# Patient Record
Sex: FEMALE | Race: WHITE | URBAN - METROPOLITAN AREA
[De-identification: names, ages, dates, MRNs, and addresses within clinical notes are randomized per-mention and may not be internally consistent; named-entity substitution may affect disease eponyms.]

---

## 2017-03-16 ENCOUNTER — OFFICE VISIT (OUTPATIENT)
Dept: FAMILY MEDICINE CLINIC | Age: 42
End: 2017-03-16
Payer: COMMERCIAL

## 2017-03-16 VITALS
RESPIRATION RATE: 17 BRPM | BODY MASS INDEX: 26.04 KG/M2 | SYSTOLIC BLOOD PRESSURE: 119 MMHG | OXYGEN SATURATION: 98 % | TEMPERATURE: 98.6 F | WEIGHT: 162.04 LBS | HEART RATE: 84 BPM | HEIGHT: 66 IN | DIASTOLIC BLOOD PRESSURE: 78 MMHG

## 2017-03-16 DIAGNOSIS — J01.01 ACUTE RECURRENT MAXILLARY SINUSITIS: Primary | ICD-10-CM

## 2017-03-16 PROCEDURE — 99213 OFFICE O/P EST LOW 20 MIN: CPT | Performed by: NURSE PRACTITIONER

## 2017-03-16 RX ORDER — DOXYCYCLINE HYCLATE 100 MG
100 TABLET ORAL 2 TIMES DAILY
Qty: 20 TABLET | Refills: 0 | Status: SHIPPED | OUTPATIENT
Start: 2017-03-16 | End: 2017-03-26

## 2017-03-16 ASSESSMENT — ENCOUNTER SYMPTOMS
SHORTNESS OF BREATH: 1
VOICE CHANGE: 0
EYE REDNESS: 0
WHEEZING: 0
SORE THROAT: 1
SWOLLEN GLANDS: 1
CHEST TIGHTNESS: 0
COUGH: 1
SINUS PRESSURE: 1
EYE DISCHARGE: 0

## 2017-07-19 ENCOUNTER — HOSPITAL ENCOUNTER (OUTPATIENT)
Age: 42
Setting detail: SPECIMEN
Discharge: HOME | End: 2017-07-19
Payer: COMMERCIAL

## 2017-07-28 LAB — CYTOLOGY REPORT: NORMAL

## 2017-08-08 ENCOUNTER — HOSPITAL ENCOUNTER (OUTPATIENT)
Dept: MAMMOGRAPHY | Age: 42
Discharge: HOME | End: 2017-08-08
Payer: COMMERCIAL

## 2017-08-08 DIAGNOSIS — Z12.31 VISIT FOR SCREENING MAMMOGRAM: ICD-10-CM

## 2017-08-08 PROCEDURE — G0202 SCR MAMMO BI INCL CAD: HCPCS

## 2017-08-08 PROCEDURE — 77063 BREAST TOMOSYNTHESIS BI: CPT

## 2018-07-11 ENCOUNTER — HOSPITAL ENCOUNTER (OUTPATIENT)
Age: 43
Setting detail: SPECIMEN
Discharge: HOME | End: 2018-07-11
Payer: COMMERCIAL

## 2018-07-24 LAB — CYTOLOGY REPORT: NORMAL

## 2018-12-28 ENCOUNTER — OFFICE VISIT (OUTPATIENT)
Dept: FAMILY MEDICINE CLINIC | Age: 43
End: 2018-12-28
Payer: COMMERCIAL

## 2018-12-28 VITALS
DIASTOLIC BLOOD PRESSURE: 90 MMHG | BODY MASS INDEX: 28.56 KG/M2 | SYSTOLIC BLOOD PRESSURE: 128 MMHG | OXYGEN SATURATION: 97 % | HEIGHT: 67 IN | HEART RATE: 64 BPM | WEIGHT: 182 LBS | TEMPERATURE: 97.6 F

## 2018-12-28 DIAGNOSIS — J01.90 ACUTE BACTERIAL SINUSITIS: Primary | ICD-10-CM

## 2018-12-28 DIAGNOSIS — B96.89 ACUTE BACTERIAL SINUSITIS: Primary | ICD-10-CM

## 2018-12-28 PROCEDURE — 99214 OFFICE O/P EST MOD 30 MIN: CPT | Performed by: NURSE PRACTITIONER

## 2018-12-28 RX ORDER — LEVOTHYROXINE SODIUM 0.07 MG/1
TABLET ORAL
COMMUNITY

## 2018-12-28 RX ORDER — ALPRAZOLAM 1 MG/1
TABLET ORAL
COMMUNITY
Start: 2018-11-26

## 2018-12-28 RX ORDER — CEFUROXIME AXETIL 250 MG/1
250 TABLET ORAL 2 TIMES DAILY
Qty: 20 TABLET | Refills: 0 | Status: SHIPPED | OUTPATIENT
Start: 2018-12-28 | End: 2019-01-07

## 2018-12-28 RX ORDER — TRAZODONE HYDROCHLORIDE 150 MG/1
150 TABLET ORAL
COMMUNITY

## 2018-12-28 RX ORDER — VITAMIN A, VITAMIN C, VITAMIN D-3, VITAMIN E, VITAMIN B-1, VITAMIN B-2, NIACIN, VITAMIN B-6, CALCIUM, IRON, ZINC, COPPER 4000; 120; 400; 22; 1.84; 3; 20; 10; 1; 12; 200; 27; 25; 2 [IU]/1; MG/1; [IU]/1; MG/1; MG/1; MG/1; MG/1; MG/1; MG/1; UG/1; MG/1; MG/1; MG/1; MG/1
TABLET ORAL
COMMUNITY
Start: 2018-12-17

## 2018-12-28 RX ORDER — VERAPAMIL HYDROCHLORIDE 80 MG/1
TABLET ORAL
COMMUNITY
End: 2019-07-26

## 2018-12-28 RX ORDER — METOPROLOL SUCCINATE 50 MG
TABLET, EXTENDED RELEASE 24 HR ORAL
COMMUNITY
Start: 2018-10-12

## 2018-12-28 RX ORDER — HYDROCODONE BITARTRATE AND ACETAMINOPHEN 5; 325 MG/1; MG/1
1 TABLET ORAL
COMMUNITY
Start: 2016-07-25

## 2018-12-28 ASSESSMENT — ENCOUNTER SYMPTOMS
WHEEZING: 0
CHEST TIGHTNESS: 0
EYE REDNESS: 0
SHORTNESS OF BREATH: 0
SINUS PRESSURE: 1
COUGH: 1
SORE THROAT: 1
EYE DISCHARGE: 0
VOICE CHANGE: 0

## 2018-12-28 NOTE — PATIENT INSTRUCTIONS
hot, wet towel or a warm gel pack on your face 3 or 4 times a day for 5 to 10 minutes each time. · Try a decongestant nasal spray like oxymetazoline (Afrin). Do not use it for more than 3 days in a row. Using it for more than 3 days can make your congestion worse. When should you call for help? Call your doctor now or seek immediate medical care if:    · You have new or worse swelling or redness in your face or around your eyes.     · You have a new or higher fever.    Watch closely for changes in your health, and be sure to contact your doctor if:    · You have new or worse facial pain.     · The mucus from your nose becomes thicker (like pus) or has new blood in it.     · You are not getting better as expected. Where can you learn more? Go to https://Cswitchpezenaidaeb.PeeplePass. org and sign in to your [employer]agin[employer] account. Enter Z603 in the Reviews42 box to learn more about \"Sinusitis: Care Instructions. \"     If you do not have an account, please click on the \"Sign Up Now\" link. Current as of: March 28, 2018  Content Version: 11.8  © 0141-4114 [employer]. Care instructions adapted under license by [facility] ([facility]). If you have questions about a medical condition or this instruction, always ask your healthcare professional. [place] [address] any warranty or liability for your use of this information. Patient Education        Saline Nasal Washes: Care Instructions  Your Care Instructions  Saline nasal washes help keep the nasal passages open by washing out thick or dried mucus. This simple remedy can help relieve symptoms of allergies, sinusitis, and colds. It also can make the nose feel more comfortable by keeping the mucous membranes moist. You may notice a little burning sensation in your nose the first few times you use the solution, but this usually gets better in a few days. Follow-up care is a key part of your treatment and safety.  Be sure to make and go

## 2019-07-26 ENCOUNTER — OFFICE VISIT (OUTPATIENT)
Dept: FAMILY MEDICINE CLINIC | Age: 44
End: 2019-07-26
Payer: COMMERCIAL

## 2019-07-26 VITALS
WEIGHT: 186 LBS | SYSTOLIC BLOOD PRESSURE: 102 MMHG | TEMPERATURE: 98.1 F | RESPIRATION RATE: 18 BRPM | BODY MASS INDEX: 31.76 KG/M2 | OXYGEN SATURATION: 98 % | HEIGHT: 64 IN | DIASTOLIC BLOOD PRESSURE: 76 MMHG | HEART RATE: 85 BPM

## 2019-07-26 DIAGNOSIS — M25.571 ACUTE RIGHT ANKLE PAIN: Primary | ICD-10-CM

## 2019-07-26 DIAGNOSIS — S96.911A RIGHT ANKLE STRAIN, INITIAL ENCOUNTER: ICD-10-CM

## 2019-07-26 PROBLEM — N39.0 RECURRENT UTI: Status: ACTIVE | Noted: 2019-04-10

## 2019-07-26 PROBLEM — N39.8 VOIDING DYSFUNCTION: Status: ACTIVE | Noted: 2019-04-10

## 2019-07-26 PROCEDURE — 99213 OFFICE O/P EST LOW 20 MIN: CPT | Performed by: NURSE PRACTITIONER

## 2019-07-26 RX ORDER — RENAGEL 800 MG/1
TABLET ORAL
COMMUNITY
Start: 2019-07-08

## 2019-07-26 RX ORDER — AMANTADINE HYDROCHLORIDE 100 MG/1
CAPSULE, GELATIN COATED ORAL
COMMUNITY
Start: 2019-07-18

## 2019-07-26 RX ORDER — GABAPENTIN 300 MG/1
300 CAPSULE ORAL
COMMUNITY
Start: 2019-04-10 | End: 2020-04-09

## 2019-07-26 RX ORDER — LORATADINE 10 MG/1
10 TABLET ORAL
COMMUNITY

## 2019-07-26 RX ORDER — TAMSULOSIN HYDROCHLORIDE 0.4 MG/1
CAPSULE ORAL
COMMUNITY
Start: 2019-06-13

## 2019-07-26 RX ORDER — VERAPAMIL HYDROCHLORIDE 180 MG/1
CAPSULE, EXTENDED RELEASE ORAL
COMMUNITY
Start: 2019-06-19

## 2019-07-26 NOTE — PATIENT INSTRUCTIONS
helps the ankle heal.  · Take pain medicines exactly as directed. ? If the doctor gave you a prescription medicine for pain, take it as prescribed. ? If you are not taking a prescription pain medicine, ask your doctor if you can take an over-the-counter medicine. · If you have been given ankle exercises to do at home, do them exactly as instructed. These can promote healing and help prevent lasting weakness. When should you call for help? Call your doctor now or seek immediate medical care if:    · Your pain is getting worse.     · Your swelling is getting worse.     · Your splint feels too tight or you are unable to loosen it.    Watch closely for changes in your health, and be sure to contact your doctor if:    · You are not getting better after 1 week. Where can you learn more? Go to https://SolveDirect Service Management.Smallable. org and sign in to your [employer]ee account. Enter L912 in the Amitree box to learn more about \"Ankle Sprain: Care Instructions. \"     If you do not have an account, please click on the \"Sign Up Now\" link. Current as of: September 20, 2018  Content Version: 12.0  © 6023-1767 [employer]. Care instructions adapted under license by [facility] ([facility]). If you have questions about a medical condition or this instruction, always ask your healthcare professional. [address] any warranty or liability for your use of this information. Patient Education        Ankle Sprain: Rehab Exercises  Your Care Instructions  Here are some examples of typical rehabilitation exercises for your condition. Start each exercise slowly. Ease off the exercise if you start to have pain. Your doctor or physical therapist will tell you when you can start these exercises and which ones will work best for you. How to do the exercises  \"Alphabet\" exercise    1. Trace the alphabet with your toe. This helps your ankle move in all directions.     Side-to-side knee swing 1 through 4. Follow-up care is a key part of your treatment and safety. Be sure to make and go to all appointments, and call your doctor if you are having problems. It's also a good idea to know your test results and keep a list of the medicines you take. Where can you learn more? Go to https://chpepiceweb.One Step Solutions. org and sign in to your [employer] account. Enter N2Care in the Picklive box to learn more about \"Ankle Sprain: Rehab Exercises. \"     If you do not have an account, please click on the \"Sign Up Now\" link. Current as of: September 20, 2018  Content Version: 12.0  © 9886-5697 [employer]. Care instructions adapted under license by [facility] ([facility]). If you have questions about a medical condition or this instruction, always ask your healthcare professional. [address] any warranty or liability for your use of this information.

## 2020-06-23 ENCOUNTER — HOSPITAL ENCOUNTER (OUTPATIENT)
Dept: MAMMOGRAPHY | Age: 45
Discharge: HOME | End: 2020-06-25
Payer: COMMERCIAL

## 2020-06-23 PROCEDURE — 77063 BREAST TOMOSYNTHESIS BI: CPT

## 2021-02-09 ENCOUNTER — HOSPITAL ENCOUNTER (OUTPATIENT)
Age: 46
Setting detail: SPECIMEN
Discharge: HOME | End: 2021-02-09
Payer: COMMERCIAL

## 2021-02-09 LAB — SARS-COV-2 ANTIBODY, TOTAL: NEGATIVE

## 2021-02-10 LAB
% NK (CD56): 14 % (ref 6–29)
AB NK (CD56): 244 /UL (ref 90–600)
ABSOLUTE CD 3: 1409 /UL (ref 411–2061)
ABSOLUTE CD 4 HELPER: 1166 /UL (ref 309–1571)
ABSOLUTE CD 8 (SUPP): 278 /UL (ref 282–999)
ABSOLUTE CD19 B CELL: 0 /UL (ref 71–567)
CD19 B CELL: 0 % (ref 5–25)
CD3 % TOTAL T CELLS: 81 % (ref 57–94)
CD4 % HELPER T CELL: 67 % (ref 27–64)
CD4:CD8: 4.19 (ref 0.6–2.8)
CD8 % SUPPRESSOR T CELL: 16 % (ref 17–48)
LYMPHOCYTES # BLD: 30 % (ref 24–44)
WBC # BLD: 5.8 K/UL (ref 3.5–11)

## 2021-12-08 NOTE — PROGRESS NOTES
Allergen Reactions    Penicillins Diarrhea       Subjective:      Review of Systems   Constitutional: Positive for fatigue and fever (felt feverish last night). Negative for chills. HENT: Positive for congestion, ear pain, sinus pressure and sore throat (son was diagnosed with strep last week). Negative for ear discharge, postnasal drip, sneezing and voice change. Eyes: Negative for discharge and redness. Respiratory: Positive for cough (thick mucous, unable to cough up). Negative for chest tightness, shortness of breath and wheezing. Cardiovascular: Negative. Negative for chest pain. Musculoskeletal: Negative for myalgias. Skin: Negative for rash. Neurological: Positive for headaches. Negative for dizziness, weakness and light-headedness. Hematological: Negative for adenopathy. All other systems reviewed and are negative. Objective:     Physical Exam   Constitutional: She appears well-developed and well-nourished. No distress. HENT:   Head: Normocephalic and atraumatic. Right Ear: Tympanic membrane and external ear normal.   Left Ear: Tympanic membrane and external ear normal.   Nose: Right sinus exhibits maxillary sinus tenderness. Right sinus exhibits no frontal sinus tenderness. Left sinus exhibits maxillary sinus tenderness. Left sinus exhibits no frontal sinus tenderness. Mouth/Throat: Oropharyngeal exudate (PD) and posterior oropharyngeal erythema present. Eyes: Right eye exhibits no discharge. Left eye exhibits no discharge. Cardiovascular: Normal rate, regular rhythm and normal heart sounds. No murmur heard. Pulmonary/Chest: Effort normal and breath sounds normal. No respiratory distress. She has no wheezes. She has no rales. Lymphadenopathy:     She has cervical adenopathy. Neurological: She is alert. Skin: Skin is warm and dry. No rash noted. She is not diaphoretic. Nursing note and vitals reviewed.     BP (!) 128/90   Pulse 64   Temp 97.6 °F (36.4 °C)
normal...